# Patient Record
Sex: FEMALE | Race: WHITE
[De-identification: names, ages, dates, MRNs, and addresses within clinical notes are randomized per-mention and may not be internally consistent; named-entity substitution may affect disease eponyms.]

---

## 2020-11-23 PROBLEM — Z00.00 ENCOUNTER FOR PREVENTIVE HEALTH EXAMINATION: Status: ACTIVE | Noted: 2020-11-23

## 2020-12-01 ENCOUNTER — APPOINTMENT (OUTPATIENT)
Dept: ENDOCRINOLOGY | Facility: CLINIC | Age: 40
End: 2020-12-01
Payer: SELF-PAY

## 2020-12-01 VITALS
BODY MASS INDEX: 26.93 KG/M2 | HEART RATE: 84 BPM | WEIGHT: 152 LBS | SYSTOLIC BLOOD PRESSURE: 108 MMHG | DIASTOLIC BLOOD PRESSURE: 69 MMHG | HEIGHT: 63 IN

## 2020-12-01 DIAGNOSIS — L68.0 HIRSUTISM: ICD-10-CM

## 2020-12-01 DIAGNOSIS — L65.9 NONSCARRING HAIR LOSS, UNSPECIFIED: ICD-10-CM

## 2020-12-01 DIAGNOSIS — Z87.2 PERSONAL HISTORY OF DISEASES OF THE SKIN AND SUBCUTANEOUS TISSUE: ICD-10-CM

## 2020-12-01 DIAGNOSIS — Z83.3 FAMILY HISTORY OF DIABETES MELLITUS: ICD-10-CM

## 2020-12-01 DIAGNOSIS — Z84.1 FAMILY HISTORY OF DISORDERS OF KIDNEY AND URETER: ICD-10-CM

## 2020-12-01 DIAGNOSIS — Z82.49 FAMILY HISTORY OF ISCHEMIC HEART DISEASE AND OTHER DISEASES OF THE CIRCULATORY SYSTEM: ICD-10-CM

## 2020-12-01 PROCEDURE — 99204 OFFICE O/P NEW MOD 45 MIN: CPT

## 2020-12-01 RX ORDER — VITAMIN E ACID SUCCINATE 268 MG
TABLET ORAL
Refills: 0 | Status: ACTIVE | COMMUNITY

## 2020-12-01 RX ORDER — CHROMIUM 200 MCG
TABLET ORAL
Refills: 0 | Status: ACTIVE | COMMUNITY

## 2020-12-01 RX ORDER — PNV NO.95/FERROUS FUM/FOLIC AC 28MG-0.8MG
TABLET ORAL
Refills: 0 | Status: ACTIVE | COMMUNITY

## 2020-12-01 RX ORDER — BIOTIN 10 MG
TABLET ORAL
Refills: 0 | Status: ACTIVE | COMMUNITY

## 2020-12-01 RX ORDER — IRON/IRON ASP GLY/FA/MV-MIN 38 125-25-1MG
TABLET ORAL
Refills: 0 | Status: ACTIVE | COMMUNITY

## 2020-12-01 NOTE — DATA REVIEWED
[FreeTextEntry1] : Laboratories (October 6, 2020) reviewed and significant for: \par Unremarkable complete blood count\par TSH 1.22 uIU/mL (normal: 0.40-4.50)\par Free T4 1.3 ng/dL (normal: 0.8-1.8)\par Total testosterone 24 ng/dL (normal: 2-45)\par Free testosterone 1.1 pg/mL (normal: 0.2-5.0)\par Dehydroepiandrosterone-sulfate 175 mcg/dL (normal: )\par Iron saturation 16% (normal: 16-45)\par Zinc 62 mcg/dL (normal: )\par Folate 5.4 ng/mL (normal: >5.4)\par Sex hormone binding globulin 133 nmol/L (normal: )\par See scanned results.

## 2020-12-01 NOTE — HISTORY OF PRESENT ILLNESS
[FreeTextEntry1] : Ms. Fried is a 40 year-old woman with a history of hair loss presenting for evaluation.\par \par Hair loss.\par She states half her hair turned white when she moved to the [place] in 2012. She has more recently had hair loss and thinning.\par Blood tests from October 2012 significant for low biotin, borderline low folic acid, borderline low iron saturation, borderline elevated sex hormone binding globulin; TSH, free T4, total/free testosterone, dehydroepiandrosterone-sulfate, zinc within range. \par She has started iron, biotin, folic acid, vitamin B12, and vitamin E supplements. She has tried plasma rich platelet injections with some improvement. She tried topical minoxidil solution with side effect of dandruff. \par She had a history of hirsutism, resolved with laser therapy. \par She had a history of acne, resolved with Accutane therapy. \par She has menstrual periods every month.

## 2020-12-01 NOTE — PHYSICAL EXAM
[Alert] : alert [Healthy Appearance] : healthy appearance [No Acute Distress] : no acute distress [Normal Sclera/Conjunctiva] : normal sclera/conjunctiva [No Neck Mass] : no neck mass was observed [No LAD] : no lymphadenopathy [Supple] : the neck was supple [Thyroid Not Enlarged] : the thyroid was not enlarged [No Respiratory Distress] : no respiratory distress [Clear to Auscultation] : lungs were clear to auscultation bilaterally [Normal S1, S2] : normal S1 and S2 [Normal Rate] : heart rate was normal [Regular Rhythm] : with a regular rhythm [No Stigmata of Cushings Syndrome] : no stigmata of Cushings Syndrome [Normal Gait] : normal gait [Normal Insight/Judgement] : insight and judgment were intact [Kyphosis] : no kyphosis present [Acanthosis Nigricans] : no acanthosis nigricans [Acne] : no acne [Hirsutism] : no hirsutism [de-identified] : no moon facies, no supraclavicular fat pads

## 2021-04-12 ENCOUNTER — APPOINTMENT (OUTPATIENT)
Age: 41
End: 2021-04-12
Payer: COMMERCIAL

## 2021-04-12 PROCEDURE — 0001A: CPT

## 2021-05-03 ENCOUNTER — APPOINTMENT (OUTPATIENT)
Age: 41
End: 2021-05-03
Payer: COMMERCIAL

## 2021-05-03 PROCEDURE — 0002A: CPT
